# Patient Record
Sex: MALE | Race: BLACK OR AFRICAN AMERICAN | NOT HISPANIC OR LATINO | Employment: UNEMPLOYED | URBAN - METROPOLITAN AREA
[De-identification: names, ages, dates, MRNs, and addresses within clinical notes are randomized per-mention and may not be internally consistent; named-entity substitution may affect disease eponyms.]

---

## 2024-11-18 ENCOUNTER — HOSPITAL ENCOUNTER (EMERGENCY)
Facility: HOSPITAL | Age: 84
Discharge: HOME | End: 2024-11-18
Attending: STUDENT IN AN ORGANIZED HEALTH CARE EDUCATION/TRAINING PROGRAM
Payer: MEDICARE

## 2024-11-18 ENCOUNTER — CLINICAL SUPPORT (OUTPATIENT)
Dept: EMERGENCY MEDICINE | Facility: HOSPITAL | Age: 84
End: 2024-11-18
Payer: MEDICARE

## 2024-11-18 ENCOUNTER — APPOINTMENT (OUTPATIENT)
Dept: RADIOLOGY | Facility: HOSPITAL | Age: 84
End: 2024-11-18
Payer: MEDICARE

## 2024-11-18 VITALS
SYSTOLIC BLOOD PRESSURE: 178 MMHG | DIASTOLIC BLOOD PRESSURE: 94 MMHG | OXYGEN SATURATION: 96 % | TEMPERATURE: 98.1 F | HEART RATE: 71 BPM | RESPIRATION RATE: 16 BRPM
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
## 2024-11-18 DIAGNOSIS — S09.90XA HEAD INJURY, INITIAL ENCOUNTER: ICD-10-CM

## 2024-11-18 PROCEDURE — 99285 EMERGENCY DEPT VISIT HI MDM: CPT | Mod: 25

## 2024-11-18 PROCEDURE — 70486 CT MAXILLOFACIAL W/O DYE: CPT | Performed by: RADIOLOGY

## 2024-11-18 PROCEDURE — 93005 ELECTROCARDIOGRAM TRACING: CPT

## 2024-11-18 PROCEDURE — 72125 CT NECK SPINE W/O DYE: CPT | Performed by: RADIOLOGY

## 2024-11-18 PROCEDURE — 70450 CT HEAD/BRAIN W/O DYE: CPT

## 2024-11-18 PROCEDURE — 70486 CT MAXILLOFACIAL W/O DYE: CPT

## 2024-11-18 PROCEDURE — 76377 3D RENDER W/INTRP POSTPROCES: CPT | Performed by: RADIOLOGY

## 2024-11-18 PROCEDURE — 76376 3D RENDER W/INTRP POSTPROCES: CPT

## 2024-11-18 PROCEDURE — 70450 CT HEAD/BRAIN W/O DYE: CPT | Performed by: RADIOLOGY

## 2024-11-18 PROCEDURE — 72125 CT NECK SPINE W/O DYE: CPT

## 2024-11-18 ASSESSMENT — COLUMBIA-SUICIDE SEVERITY RATING SCALE - C-SSRS
6. HAVE YOU EVER DONE ANYTHING, STARTED TO DO ANYTHING, OR PREPARED TO DO ANYTHING TO END YOUR LIFE?: NO
2. HAVE YOU ACTUALLY HAD ANY THOUGHTS OF KILLING YOURSELF?: NO
1. IN THE PAST MONTH, HAVE YOU WISHED YOU WERE DEAD OR WISHED YOU COULD GO TO SLEEP AND NOT WAKE UP?: NO

## 2024-11-18 ASSESSMENT — LIFESTYLE VARIABLES
HAVE PEOPLE ANNOYED YOU BY CRITICIZING YOUR DRINKING: NO
TOTAL SCORE: 0
HAVE YOU EVER FELT YOU SHOULD CUT DOWN ON YOUR DRINKING: NO
EVER FELT BAD OR GUILTY ABOUT YOUR DRINKING: NO
EVER HAD A DRINK FIRST THING IN THE MORNING TO STEADY YOUR NERVES TO GET RID OF A HANGOVER: NO

## 2024-11-18 NOTE — ED PROVIDER NOTES
EMERGENCY DEPARTMENT ENCOUNTER      Pt Name: Hever Iyer  MRN: [id number]  Birthdate 1940  Date of evaluation: 11/18/2024  Provider: Indra Wright DO    CHIEF COMPLAINT       Chief Complaint   Patient presents with    Fall         HISTORY OF PRESENT ILLNESS    The patient is an 84-year-old male with past medical history significant for dementia presenting today after he sustained a mechanical fall.  Patient lives at home with his daughter, apparently he wandered away from home and was found on the ground.  He is not able to provide any reliable history given his history of dementia.  Patient does appear to be at his baseline per the patient's daughter.  He does have a small amount of blood around the left eye as well as around the oropharynx.            Nursing Notes were reviewed.    PAST MEDICAL HISTORY   No past medical history on file.      SURGICAL HISTORY     No past surgical history on file.      CURRENT MEDICATIONS       Previous Medications    No medications on file       ALLERGIES     Patient has no known allergies.    FAMILY HISTORY     No family history on file.       SOCIAL HISTORY       Social History     Socioeconomic History    Marital status:      Social Drivers of Health     Financial Resource Strain: Low Risk  (4/30/2021)    Received from [facility]    Overall Financial Resource Strain (CARDIA)     Difficulty of Paying Living Expenses: Not hard at all   Food Insecurity: No Food Insecurity (2/15/2024)    Received from [facility]    Hunger Vital Sign     Worried About Running Out of Food in the Last Year: Never true     Ran Out of Food in the Last Year: Never true   Transportation Needs: No Transportation Needs (2/15/2024)    Received from [facility]    PRAPARE - Transportation     Lack of Transportation (Medical): No     Lack of Transportation (Non-Medical): No   Stress: No Stress Concern Present (8/25/2022)    Received from [facility]    [employer] [employer] -  Occupational Stress Questionnaire     Feeling of Stress : Not at all   Social Connections: Moderately Integrated (4/30/2021)    Received from [facility]    Social Connection and Isolation Panel [NHANES]     Frequency of Communication with Friends and Family: More than three times a week     Frequency of Social Gatherings with Friends and Family: More than three times a week     Attends Synagogue Services: More than 4 times per year     Active Member of Clubs or Organizations: Yes     Attends Club or Organization Meetings: Patient declined     Marital Status:    Intimate Partner Violence: Not At Risk (4/30/2021)    Received from [facility]    Humiliation, Afraid, Rape, and Kick questionnaire     Fear of Current or Ex-Partner: No     Emotionally Abused: No     Physically Abused: No     Sexually Abused: No   Housing Stability: Unknown (2/15/2024)    Received from [facility]    Housing Stability Vital Sign     Unable to Pay for Housing in the Last Year: No     Unstable Housing in the Last Year: No       SCREENINGS                        PHYSICAL EXAM    (up to 7 for level 4, 8 or more for level 5)     ED Triage Vitals   Temp Pulse Resp BP   -- -- -- --      SpO2 Temp src Heart Rate Source Patient Position   -- -- -- --      BP Location FiO2 (%)     -- --       Physical Exam  Vitals and nursing note reviewed.   Constitutional:       General: He is not in acute distress.     Appearance: Normal appearance. He is not ill-appearing or toxic-appearing.   HENT:      Head:      Comments: Small mount of blood about the left eye.     Right Ear: External ear normal.      Left Ear: External ear normal.      Nose: Nose normal.      Mouth/Throat:      Comments: Amount of blood about the oropharynx.  No obvious laceration or injury to his dentition.  Midface stable.  Eyes:      General:         Right eye: No discharge.         Left eye: No discharge.      Extraocular Movements: Extraocular movements intact.       Conjunctiva/sclera: Conjunctivae normal.      Pupils: Pupils are equal, round, and reactive to light.   Cardiovascular:      Rate and Rhythm: Normal rate and regular rhythm.      Pulses: Normal pulses.      Heart sounds: Normal heart sounds.   Pulmonary:      Effort: Pulmonary effort is normal.      Breath sounds: Normal breath sounds.   Abdominal:      General: There is no distension.      Palpations: Abdomen is soft. There is no mass.      Tenderness: There is no abdominal tenderness. There is no guarding.   Musculoskeletal:         General: No swelling, tenderness, deformity or signs of injury.   Skin:     General: Skin is warm and dry.   Neurological:      General: No focal deficit present.      Mental Status: He is alert. Mental status is at baseline.   Psychiatric:         Mood and Affect: Mood normal.         Behavior: Behavior normal.          DIAGNOSTIC RESULTS     LABS:  Labs Reviewed - No data to display    All other labs were within normal range or not returned as of this dictation.    Imaging  CT head wo IV contrast   Final Result   1. No acute intracranial hemorrhage or mass effect.   2. No acute facial bone fracture.   3. No acute fracture or traumatic malalignment of the cervical spine.        Signed by: Mika Rae 11/18/2024 8:26 PM   Dictation workstation:   [id number]      CT maxillofacial bones wo IV contrast   Final Result   1. No acute intracranial hemorrhage or mass effect.   2. No acute facial bone fracture.   3. No acute fracture or traumatic malalignment of the cervical spine.        Signed by: Mika Rae 11/18/2024 8:26 PM   Dictation workstation:   [id number]      CT cervical spine wo IV contrast   Final Result   1. No acute intracranial hemorrhage or mass effect.   2. No acute facial bone fracture.   3. No acute fracture or traumatic malalignment of the cervical spine.        Signed by: Mika Rae 11/18/2024 8:26 PM   Dictation workstation:   [id number]            Procedures  Procedures     EMERGENCY DEPARTMENT COURSE/MDM:   Medical Decision Making  84-year-old male presenting today after he sustained a fall.  Patient has a history that is significant for dementia.  He is not on any blood thinners.  He is unable to tell me any of the details surrounding the fall.  Patient lives at home with his daughter, she states that he is at his baseline.  He wandered away from home today and was found on the ground.  Exam notable for small amount of blood about the left eye as well as about the oropharynx.  No obvious injury to the mouth.  Will obtain CT of the head and CT of the C-spine to evaluate for any acute injuries.  Exam is otherwise unremarkable.  No obvious injuries, deformities or tenderness palpation of the bilateral upper or lower extremities.  Differential includes is not limited to subarachnoid hemorrhage, intraparenchymal hemorrhage, concussion, C-spine injury.        Please see ED course for additional MDM.    ED Course as of 11/18/24 2035   Mon Nov 18, 2024 2028 CT of the head C-spine and facial bones without any acute fractures or injuries.  No intracranial hemorrhage or mass. [CL]   2034 Results were discussed with the patient and the patient's family.  They are in agreement plan for discharge.  Patient was discharged in stable condition.  Return precautions discussed. [CL]      ED Course User Index  [CL] Indra Wright DO         Diagnoses as of 11/18/24 2035   Fall, initial encounter   Head injury, initial encounter        CT head wo IV contrast   Final Result   1. No acute intracranial hemorrhage or mass effect.   2. No acute facial bone fracture.   3. No acute fracture or traumatic malalignment of the cervical spine.        Signed by: Mika Rae 11/18/2024 8:26 PM   Dictation workstation:   [id number]      CT maxillofacial bones wo IV contrast   Final Result   1. No acute intracranial hemorrhage or mass effect.   2. No acute facial bone  fracture.   3. No acute fracture or traumatic malalignment of the cervical spine.        Signed by: Mika Rae 11/18/2024 8:26 PM   Dictation workstation:   [id number]      CT cervical spine wo IV contrast   Final Result   1. No acute intracranial hemorrhage or mass effect.   2. No acute facial bone fracture.   3. No acute fracture or traumatic malalignment of the cervical spine.        Signed by: Mika Rae 11/18/2024 8:26 PM   Dictation workstation:   [id number]          Patient and or family in agreement and understanding of treatment plan.  All questions answered.      I reviewed the case with the attending ED physician. The attending ED physician agrees with the plan. Patient and/or patient´s representative was counseled regarding labs, imaging, likely diagnosis, and plan. All questions were answered.    ED Medications administered this visit:  Medications - No data to display    New Prescriptions from this visit:    New Prescriptions    No medications on file       Follow-up:  Andreas Liu MD  [address] [place] [address]  [phone]    Schedule an appointment as soon as possible for a visit           Final Impression:   1. Fall, initial encounter    2. Head injury, initial encounter          (Please note that portions of this note were completed with a voice recognition program.  Efforts were made to edit the dictations but occasionally words are mis-transcribed.)     Indra Wright,   Resident  11/18/24 2035

## 2024-11-18 NOTE — ED TRIAGE NOTES
Patient BIB EMS after a bystander had found him down; patient with a hx of dementia, wandered outside of where he normally stays, and a bystander found him on the ground; he arrives with a 1-2 cm laceration the L eyebrow, bleeding noted on arrival; he also has a swollen top lip with dried blood present; in extraction collar per EMS; not on anticoagulation per family at bedside; Oriented only to himself on arrival; denies any pain; does not remember falling, per family he ambulates with a shuffling gait at baseline, and they believe he may have tripped over his feet; patient denies HA,light-headedness, dizziness, NV; no acute hypoxia or WOB noted on RA; PERRLA; bg 121 per EMS

## 2024-11-19 LAB
ATRIAL RATE: 71 BPM
P AXIS: 72 DEGREES
P OFFSET: 176 MS
P ONSET: 137 MS
PR INTERVAL: 152 MS
Q ONSET: 213 MS
QRS COUNT: 12 BEATS
QRS DURATION: 78 MS
QT INTERVAL: 380 MS
QTC CALCULATION(BAZETT): 412 MS
QTC FREDERICIA: 402 MS
R AXIS: -11 DEGREES
T AXIS: 115 DEGREES
T OFFSET: 403 MS
VENTRICULAR RATE: 71 BPM

## 2024-11-19 NOTE — DISCHARGE INSTRUCTIONS
Seek immediate medical attention if you develop: worsening headache, nausea, vomiting, confusion, weakness, loss of motion in your arms or legs, loss of control of your urine or stool, difficulty waking from sleep, neck pain, fever, or any new or worsening symptoms.  Please otherwise follow-up with your primary care provider for routine ER follow-up.